# Patient Record
Sex: MALE | Race: WHITE | NOT HISPANIC OR LATINO | Employment: FULL TIME | URBAN - METROPOLITAN AREA
[De-identification: names, ages, dates, MRNs, and addresses within clinical notes are randomized per-mention and may not be internally consistent; named-entity substitution may affect disease eponyms.]

---

## 2023-11-06 ENCOUNTER — HOSPITAL ENCOUNTER (OUTPATIENT)
Facility: CLINIC | Age: 28
Setting detail: OBSERVATION
Discharge: HOME | End: 2023-11-07
Attending: STUDENT IN AN ORGANIZED HEALTH CARE EDUCATION/TRAINING PROGRAM | Admitting: HOSPITALIST

## 2023-11-06 DIAGNOSIS — K92.2 UPPER GI BLEED: ICD-10-CM

## 2023-11-06 LAB
ABO/RH(D): NORMAL
ALBUMIN SERPL BCG-MCNC: 4.2 G/DL (ref 3.5–5.2)
ALP SERPL-CCNC: 55 U/L (ref 40–129)
ALT SERPL W P-5'-P-CCNC: 21 U/L (ref 0–70)
ANION GAP SERPL CALCULATED.3IONS-SCNC: 8 MMOL/L (ref 7–15)
ANTIBODY SCREEN: NEGATIVE
AST SERPL W P-5'-P-CCNC: 16 U/L (ref 0–45)
BASOPHILS # BLD AUTO: 0.1 10E3/UL (ref 0–0.2)
BASOPHILS NFR BLD AUTO: 1 %
BILIRUB SERPL-MCNC: 0.7 MG/DL
BUN SERPL-MCNC: 32.8 MG/DL (ref 6–20)
CALCIUM SERPL-MCNC: 9 MG/DL (ref 8.6–10)
CHLORIDE SERPL-SCNC: 102 MMOL/L (ref 98–107)
CREAT SERPL-MCNC: 1.03 MG/DL (ref 0.67–1.17)
DEPRECATED HCO3 PLAS-SCNC: 28 MMOL/L (ref 22–29)
EGFRCR SERPLBLD CKD-EPI 2021: >90 ML/MIN/1.73M2
EOSINOPHIL # BLD AUTO: 0.4 10E3/UL (ref 0–0.7)
EOSINOPHIL NFR BLD AUTO: 4 %
ERYTHROCYTE [DISTWIDTH] IN BLOOD BY AUTOMATED COUNT: 12 % (ref 10–15)
GLUCOSE SERPL-MCNC: 110 MG/DL (ref 70–99)
HCT VFR BLD AUTO: 44.1 % (ref 40–53)
HGB BLD-MCNC: 12.7 G/DL (ref 13.3–17.7)
HGB BLD-MCNC: 14.7 G/DL (ref 13.3–17.7)
HOLD SPECIMEN: NORMAL
IMM GRANULOCYTES # BLD: 0.1 10E3/UL
IMM GRANULOCYTES NFR BLD: 1 %
LYMPHOCYTES # BLD AUTO: 3 10E3/UL (ref 0.8–5.3)
LYMPHOCYTES NFR BLD AUTO: 26 %
MCH RBC QN AUTO: 30.7 PG (ref 26.5–33)
MCHC RBC AUTO-ENTMCNC: 33.3 G/DL (ref 31.5–36.5)
MCV RBC AUTO: 92 FL (ref 78–100)
MONOCYTES # BLD AUTO: 0.8 10E3/UL (ref 0–1.3)
MONOCYTES NFR BLD AUTO: 7 %
NEUTROPHILS # BLD AUTO: 7.4 10E3/UL (ref 1.6–8.3)
NEUTROPHILS NFR BLD AUTO: 61 %
NRBC # BLD AUTO: 0 10E3/UL
NRBC BLD AUTO-RTO: 0 /100
PLATELET # BLD AUTO: 179 10E3/UL (ref 150–450)
POTASSIUM SERPL-SCNC: 4.7 MMOL/L (ref 3.4–5.3)
PROT SERPL-MCNC: 5.9 G/DL (ref 6.4–8.3)
RBC # BLD AUTO: 4.79 10E6/UL (ref 4.4–5.9)
SODIUM SERPL-SCNC: 138 MMOL/L (ref 135–145)
SPECIMEN EXPIRATION DATE: NORMAL
WBC # BLD AUTO: 11.7 10E3/UL (ref 4–11)

## 2023-11-06 PROCEDURE — 36415 COLL VENOUS BLD VENIPUNCTURE: CPT | Performed by: PHYSICIAN ASSISTANT

## 2023-11-06 PROCEDURE — 80053 COMPREHEN METABOLIC PANEL: CPT | Performed by: STUDENT IN AN ORGANIZED HEALTH CARE EDUCATION/TRAINING PROGRAM

## 2023-11-06 PROCEDURE — C9113 INJ PANTOPRAZOLE SODIUM, VIA: HCPCS | Mod: JZ | Performed by: PHYSICIAN ASSISTANT

## 2023-11-06 PROCEDURE — 96374 THER/PROPH/DIAG INJ IV PUSH: CPT

## 2023-11-06 PROCEDURE — 85025 COMPLETE CBC W/AUTO DIFF WBC: CPT | Performed by: STUDENT IN AN ORGANIZED HEALTH CARE EDUCATION/TRAINING PROGRAM

## 2023-11-06 PROCEDURE — C9113 INJ PANTOPRAZOLE SODIUM, VIA: HCPCS | Mod: JZ | Performed by: STUDENT IN AN ORGANIZED HEALTH CARE EDUCATION/TRAINING PROGRAM

## 2023-11-06 PROCEDURE — 99222 1ST HOSP IP/OBS MODERATE 55: CPT | Mod: AI | Performed by: PHYSICIAN ASSISTANT

## 2023-11-06 PROCEDURE — 99285 EMERGENCY DEPT VISIT HI MDM: CPT | Mod: 25

## 2023-11-06 PROCEDURE — 250N000011 HC RX IP 250 OP 636: Mod: JZ | Performed by: STUDENT IN AN ORGANIZED HEALTH CARE EDUCATION/TRAINING PROGRAM

## 2023-11-06 PROCEDURE — 258N000003 HC RX IP 258 OP 636: Performed by: STUDENT IN AN ORGANIZED HEALTH CARE EDUCATION/TRAINING PROGRAM

## 2023-11-06 PROCEDURE — 250N000011 HC RX IP 250 OP 636: Mod: JZ | Performed by: PHYSICIAN ASSISTANT

## 2023-11-06 PROCEDURE — G0378 HOSPITAL OBSERVATION PER HR: HCPCS

## 2023-11-06 PROCEDURE — 85018 HEMOGLOBIN: CPT | Performed by: PHYSICIAN ASSISTANT

## 2023-11-06 PROCEDURE — 36415 COLL VENOUS BLD VENIPUNCTURE: CPT | Performed by: STUDENT IN AN ORGANIZED HEALTH CARE EDUCATION/TRAINING PROGRAM

## 2023-11-06 PROCEDURE — 96376 TX/PRO/DX INJ SAME DRUG ADON: CPT

## 2023-11-06 PROCEDURE — 96375 TX/PRO/DX INJ NEW DRUG ADDON: CPT

## 2023-11-06 PROCEDURE — 86901 BLOOD TYPING SEROLOGIC RH(D): CPT | Performed by: STUDENT IN AN ORGANIZED HEALTH CARE EDUCATION/TRAINING PROGRAM

## 2023-11-06 PROCEDURE — 258N000003 HC RX IP 258 OP 636: Performed by: PHYSICIAN ASSISTANT

## 2023-11-06 PROCEDURE — 93005 ELECTROCARDIOGRAM TRACING: CPT

## 2023-11-06 RX ORDER — PROCHLORPERAZINE MALEATE 10 MG
10 TABLET ORAL EVERY 6 HOURS PRN
Status: DISCONTINUED | OUTPATIENT
Start: 2023-11-06 | End: 2023-11-07 | Stop reason: HOSPADM

## 2023-11-06 RX ORDER — NALOXONE HYDROCHLORIDE 0.4 MG/ML
0.4 INJECTION, SOLUTION INTRAMUSCULAR; INTRAVENOUS; SUBCUTANEOUS
Status: DISCONTINUED | OUTPATIENT
Start: 2023-11-06 | End: 2023-11-07 | Stop reason: HOSPADM

## 2023-11-06 RX ORDER — ACETAMINOPHEN 500 MG
1000 TABLET ORAL EVERY 6 HOURS PRN
Status: DISCONTINUED | OUTPATIENT
Start: 2023-11-06 | End: 2023-11-07 | Stop reason: HOSPADM

## 2023-11-06 RX ORDER — SODIUM CHLORIDE, SODIUM LACTATE, POTASSIUM CHLORIDE, CALCIUM CHLORIDE 600; 310; 30; 20 MG/100ML; MG/100ML; MG/100ML; MG/100ML
INJECTION, SOLUTION INTRAVENOUS CONTINUOUS
Status: DISCONTINUED | OUTPATIENT
Start: 2023-11-06 | End: 2023-11-07 | Stop reason: HOSPADM

## 2023-11-06 RX ORDER — ONDANSETRON 2 MG/ML
4 INJECTION INTRAMUSCULAR; INTRAVENOUS ONCE
Status: COMPLETED | OUTPATIENT
Start: 2023-11-06 | End: 2023-11-06

## 2023-11-06 RX ORDER — ONDANSETRON 2 MG/ML
4 INJECTION INTRAMUSCULAR; INTRAVENOUS EVERY 6 HOURS PRN
Status: DISCONTINUED | OUTPATIENT
Start: 2023-11-06 | End: 2023-11-07 | Stop reason: HOSPADM

## 2023-11-06 RX ORDER — AMOXICILLIN 250 MG
1 CAPSULE ORAL 2 TIMES DAILY PRN
Status: DISCONTINUED | OUTPATIENT
Start: 2023-11-06 | End: 2023-11-07 | Stop reason: HOSPADM

## 2023-11-06 RX ORDER — PROCHLORPERAZINE 25 MG
25 SUPPOSITORY, RECTAL RECTAL EVERY 12 HOURS PRN
Status: DISCONTINUED | OUTPATIENT
Start: 2023-11-06 | End: 2023-11-07 | Stop reason: HOSPADM

## 2023-11-06 RX ORDER — NALOXONE HYDROCHLORIDE 0.4 MG/ML
0.2 INJECTION, SOLUTION INTRAMUSCULAR; INTRAVENOUS; SUBCUTANEOUS
Status: DISCONTINUED | OUTPATIENT
Start: 2023-11-06 | End: 2023-11-07 | Stop reason: HOSPADM

## 2023-11-06 RX ORDER — HYDROMORPHONE HCL IN WATER/PF 6 MG/30 ML
0.2 PATIENT CONTROLLED ANALGESIA SYRINGE INTRAVENOUS EVERY 6 HOURS PRN
Status: DISCONTINUED | OUTPATIENT
Start: 2023-11-06 | End: 2023-11-07 | Stop reason: HOSPADM

## 2023-11-06 RX ORDER — OXYCODONE HYDROCHLORIDE 5 MG/1
5 TABLET ORAL EVERY 4 HOURS PRN
Status: DISCONTINUED | OUTPATIENT
Start: 2023-11-06 | End: 2023-11-07 | Stop reason: HOSPADM

## 2023-11-06 RX ORDER — ONDANSETRON 4 MG/1
4 TABLET, ORALLY DISINTEGRATING ORAL EVERY 6 HOURS PRN
Status: DISCONTINUED | OUTPATIENT
Start: 2023-11-06 | End: 2023-11-07 | Stop reason: HOSPADM

## 2023-11-06 RX ORDER — AMOXICILLIN 250 MG
2 CAPSULE ORAL 2 TIMES DAILY PRN
Status: DISCONTINUED | OUTPATIENT
Start: 2023-11-06 | End: 2023-11-07 | Stop reason: HOSPADM

## 2023-11-06 RX ADMIN — SODIUM CHLORIDE 1000 ML: 9 INJECTION, SOLUTION INTRAVENOUS at 17:22

## 2023-11-06 RX ADMIN — SODIUM CHLORIDE, POTASSIUM CHLORIDE, SODIUM LACTATE AND CALCIUM CHLORIDE: 600; 310; 30; 20 INJECTION, SOLUTION INTRAVENOUS at 20:36

## 2023-11-06 RX ADMIN — PANTOPRAZOLE SODIUM 80 MG: 40 INJECTION, POWDER, FOR SOLUTION INTRAVENOUS at 18:02

## 2023-11-06 RX ADMIN — ONDANSETRON 4 MG: 2 INJECTION INTRAMUSCULAR; INTRAVENOUS at 17:22

## 2023-11-06 RX ADMIN — PANTOPRAZOLE SODIUM 40 MG: 40 INJECTION, POWDER, FOR SOLUTION INTRAVENOUS at 20:36

## 2023-11-06 ASSESSMENT — ACTIVITIES OF DAILY LIVING (ADL)
ADLS_ACUITY_SCORE: 35
ADLS_ACUITY_SCORE: 18
ADLS_ACUITY_SCORE: 35
ADLS_ACUITY_SCORE: 20

## 2023-11-06 NOTE — ED TRIAGE NOTES
C/O nausea and vomiting; emesis bright red. Hx acid reflux. Denies alcohol. Pt also reports 1 month diarrhea that has now resolved as of 2 weeks ago. Denies medical hx or blood thinner use. ABC in tact; tachycardia; A/OX4

## 2023-11-06 NOTE — ED PROVIDER NOTES
History     Chief Complaint:  Hematemesis       HPI   Vladislav Joseph is a 28 year old male with a history of acid reflux who presents with hematemesis. The patient states that today he was feeling nauseated all day then around 1530 he had 3 episodes of vomiting, all containing bright red blood. The patient showed me a picture of one episode which looked like maroon colored vomit with some clots.  He states that his acid reflux has gotten so bad in the past that vomiting has made him feel better, last time vomiting before today was about a month ago. He mentions that he became very light-headed while in triage and thinks he might have had a syncopal episode after getting an IV set up. He states he does not regularly drink alcohol, no recent aspirin use, no suspicious foods today. He denies any blood in stool.  Denies using NSAIDs and salicylates.    Independent Historian:    None - Patient Only    Review of External Notes:  None     Allergies:  Penicillins     Physical Exam   Patient Vitals for the past 24 hrs:   BP Temp Temp src Pulse Resp SpO2 Weight   11/06/23 1830 90/59 -- -- 103 -- 100 % --   11/06/23 1812 -- -- -- 110 -- 100 % --   11/06/23 1757 106/74 -- -- 105 -- 99 % --   11/06/23 1742 116/65 -- -- 113 -- 99 % --   11/06/23 1727 114/65 -- -- 106 -- 95 % --   11/06/23 1712 109/71 -- -- 110 -- 100 % --   11/06/23 1650 119/68 96.8  F (36  C) Temporal (!) 133 18 100 % 87.9 kg (193 lb 12.6 oz)        Physical Exam  GENERAL: Patient appears slightly fatigued and in mild distress.  HEAD: Atraumatic.  NECK: No rigidity  CV: Tachycardic and regular, no murmurs rubs or gallops  PULM: CTAB with good aeration; no retractions, rales, rhonchi, or wheezing  ABD: Soft, nontender, nondistended, no guarding  DERM: No rash. Skin warm and dry  EXTREMITY: Moving all extremities without difficulty. No calf tenderness or peripheral edema  VASCULAR: Symmetric pulses bilaterally      Emergency Department Course   ECG  ECG  obtained at 1717, ECG read at 1718  Sinus tachycardia  Otherwise normal ECG  Rate 103 bpm. IA interval 140 ms. QRS duration 76 ms. QT/QTc 324/424 ms. P-R-T axes 42 46 47.    Laboratory: Imaging:   Labs Ordered and Resulted from Time of ED Arrival to Time of ED Departure   COMPREHENSIVE METABOLIC PANEL - Abnormal       Result Value    Sodium 138      Potassium 4.7      Carbon Dioxide (CO2) 28      Anion Gap 8      Urea Nitrogen 32.8 (*)     Creatinine 1.03      GFR Estimate >90      Calcium 9.0      Chloride 102      Glucose 110 (*)     Alkaline Phosphatase 55      AST 16      ALT 21      Protein Total 5.9 (*)     Albumin 4.2      Bilirubin Total 0.7     CBC WITH PLATELETS AND DIFFERENTIAL - Abnormal    WBC Count 11.7 (*)     RBC Count 4.79      Hemoglobin 14.7      Hematocrit 44.1      MCV 92      MCH 30.7      MCHC 33.3      RDW 12.0      Platelet Count 179      % Neutrophils 61      % Lymphocytes 26      % Monocytes 7      % Eosinophils 4      % Basophils 1      % Immature Granulocytes 1      NRBCs per 100 WBC 0      Absolute Neutrophils 7.4      Absolute Lymphocytes 3.0      Absolute Monocytes 0.8      Absolute Eosinophils 0.4      Absolute Basophils 0.1      Absolute Immature Granulocytes 0.1      Absolute NRBCs 0.0     TYPE AND SCREEN, ADULT    ABO/RH(D) A POS      Antibody Screen Negative      SPECIMEN EXPIRATION DATE [id number]     ABO/RH TYPE AND SCREEN     No orders to display           Emergency Department Course & Assessments:       Interventions:  Medications   lactated ringers BOLUS 1,000 mL (1,000 mLs Intravenous Not Given 11/6/23 1842)   lactated ringers BOLUS 1,000 mL (has no administration in time range)   ondansetron (ZOFRAN) injection 4 mg (4 mg Intravenous $Given 11/6/23 1722)   sodium chloride 0.9% BOLUS 1,000 mL (1,000 mLs Intravenous $New Bag 11/6/23 1722)   pantoprazole (PROTONIX) IV push injection 80 mg (80 mg Intravenous $Given 11/6/23 1802)      Assessments, Independent Interpretation,  Consult/Discussion of ManagementTests:  ED Course as of 11/06/23 1918 Mon Nov 06, 2023   1710 I examined the patient and obtained history    1809 I consulted with Dr. Bruno about the patient and plan of care   Hospitalist PA for Dr. Perez    Social Determinants of Health affecting care:  None    Disposition:  The patient was admitted to the hospital under the care of Dr. Bruno.     Impression & Plan    CMS Diagnoses: None    Code Status: No Order    Medical Decision Making:  Symptoms concerning for upper GI bleed.   Chronic conditions complicating -prior episodes of gastritis and vomiting.  DDx considered esophageal rupture, lower GI bleed, coagulopathy.  Labs notable for initial hemoglobin within normal meds.  BUN is elevated at 32.8.  Repeat hemoglobin 12.7.  Blood pressure slightly soft but heart rate did improve with fluids and systolic blood pressure in the 90s.  Type and screened   Given IV protonix   Patient is not anticoagulated.  No excess alcohol use and no reported liver disease, therefore do not think this is variceal.  Discussed with GI DrJami who recommended scope in the morning unless patient decompensates and then it would occur or promptly.  Discussed with hospitalist PA.  Patient to be admitted with plan for endoscopy.  Patient admitted in improved condition.         Diagnosis:    ICD-10-CM    1. Upper GI bleed  K92.2            Discharge Medications:  New Prescriptions    No medications on file        Scribe Disclosure:  I, Sonu Christian, am serving as a scribe at 5:03 PM on 11/6/2023 to document services personally performed by Chester Arteaga MD based on my observations and the provider's statements to me.    11/6/2023   Chester Arteaga MD Foss, Kevin, MD  11/06/23 2001

## 2023-11-07 ENCOUNTER — ANESTHESIA (OUTPATIENT)
Dept: SURGERY | Facility: CLINIC | Age: 28
End: 2023-11-07

## 2023-11-07 ENCOUNTER — ANESTHESIA EVENT (OUTPATIENT)
Dept: SURGERY | Facility: CLINIC | Age: 28
End: 2023-11-07

## 2023-11-07 VITALS
TEMPERATURE: 97.6 F | SYSTOLIC BLOOD PRESSURE: 98 MMHG | HEART RATE: 79 BPM | OXYGEN SATURATION: 100 % | HEIGHT: 67 IN | BODY MASS INDEX: 31.18 KG/M2 | RESPIRATION RATE: 16 BRPM | WEIGHT: 198.63 LBS | DIASTOLIC BLOOD PRESSURE: 62 MMHG

## 2023-11-07 LAB
ALBUMIN SERPL BCG-MCNC: 3.3 G/DL (ref 3.5–5.2)
ALP SERPL-CCNC: 43 U/L (ref 40–129)
ALT SERPL W P-5'-P-CCNC: 15 U/L (ref 0–70)
ANION GAP SERPL CALCULATED.3IONS-SCNC: 10 MMOL/L (ref 7–15)
AST SERPL W P-5'-P-CCNC: 12 U/L (ref 0–45)
ATRIAL RATE - MUSE: 103 BPM
BASOPHILS # BLD AUTO: 0 10E3/UL (ref 0–0.2)
BASOPHILS NFR BLD AUTO: 0 %
BILIRUB SERPL-MCNC: 0.4 MG/DL
BUN SERPL-MCNC: 26.9 MG/DL (ref 6–20)
CALCIUM SERPL-MCNC: 8.4 MG/DL (ref 8.6–10)
CHLORIDE SERPL-SCNC: 105 MMOL/L (ref 98–107)
CREAT SERPL-MCNC: 1.05 MG/DL (ref 0.67–1.17)
DEPRECATED HCO3 PLAS-SCNC: 26 MMOL/L (ref 22–29)
DIASTOLIC BLOOD PRESSURE - MUSE: NORMAL MMHG
EGFRCR SERPLBLD CKD-EPI 2021: >90 ML/MIN/1.73M2
EOSINOPHIL # BLD AUTO: 0.1 10E3/UL (ref 0–0.7)
EOSINOPHIL NFR BLD AUTO: 1 %
ERYTHROCYTE [DISTWIDTH] IN BLOOD BY AUTOMATED COUNT: 12.3 % (ref 10–15)
GLUCOSE SERPL-MCNC: 98 MG/DL (ref 70–99)
HCT VFR BLD AUTO: 34.8 % (ref 40–53)
HGB BLD-MCNC: 11.1 G/DL (ref 13.3–17.7)
HGB BLD-MCNC: 11.7 G/DL (ref 13.3–17.7)
HGB BLD-MCNC: 12.2 G/DL (ref 13.3–17.7)
IMM GRANULOCYTES # BLD: 0.1 10E3/UL
IMM GRANULOCYTES NFR BLD: 1 %
INTERPRETATION ECG - MUSE: NORMAL
LYMPHOCYTES # BLD AUTO: 3.5 10E3/UL (ref 0.8–5.3)
LYMPHOCYTES NFR BLD AUTO: 38 %
MCH RBC QN AUTO: 31 PG (ref 26.5–33)
MCHC RBC AUTO-ENTMCNC: 33.6 G/DL (ref 31.5–36.5)
MCV RBC AUTO: 92 FL (ref 78–100)
MONOCYTES # BLD AUTO: 0.6 10E3/UL (ref 0–1.3)
MONOCYTES NFR BLD AUTO: 7 %
NEUTROPHILS # BLD AUTO: 4.8 10E3/UL (ref 1.6–8.3)
NEUTROPHILS NFR BLD AUTO: 53 %
NRBC # BLD AUTO: 0 10E3/UL
NRBC BLD AUTO-RTO: 0 /100
P AXIS - MUSE: 42 DEGREES
PLATELET # BLD AUTO: 152 10E3/UL (ref 150–450)
POTASSIUM SERPL-SCNC: 3.9 MMOL/L (ref 3.4–5.3)
PR INTERVAL - MUSE: 140 MS
PROT SERPL-MCNC: 5.1 G/DL (ref 6.4–8.3)
QRS DURATION - MUSE: 76 MS
QT - MUSE: 324 MS
QTC - MUSE: 424 MS
R AXIS - MUSE: 46 DEGREES
RBC # BLD AUTO: 3.77 10E6/UL (ref 4.4–5.9)
SODIUM SERPL-SCNC: 141 MMOL/L (ref 135–145)
SYSTOLIC BLOOD PRESSURE - MUSE: NORMAL MMHG
T AXIS - MUSE: 47 DEGREES
UPPER GI ENDOSCOPY: NORMAL
VENTRICULAR RATE- MUSE: 103 BPM
WBC # BLD AUTO: 9.1 10E3/UL (ref 4–11)

## 2023-11-07 PROCEDURE — 258N000003 HC RX IP 258 OP 636: Performed by: PHYSICIAN ASSISTANT

## 2023-11-07 PROCEDURE — G0378 HOSPITAL OBSERVATION PER HR: HCPCS

## 2023-11-07 PROCEDURE — 999N000141 HC STATISTIC PRE-PROCEDURE NURSING ASSESSMENT: Performed by: INTERNAL MEDICINE

## 2023-11-07 PROCEDURE — 99239 HOSP IP/OBS DSCHRG MGMT >30: CPT | Performed by: INTERNAL MEDICINE

## 2023-11-07 PROCEDURE — C9113 INJ PANTOPRAZOLE SODIUM, VIA: HCPCS | Mod: JZ | Performed by: PHYSICIAN ASSISTANT

## 2023-11-07 PROCEDURE — 250N000009 HC RX 250: Performed by: NURSE ANESTHETIST, CERTIFIED REGISTERED

## 2023-11-07 PROCEDURE — 258N000003 HC RX IP 258 OP 636: Performed by: NURSE ANESTHETIST, CERTIFIED REGISTERED

## 2023-11-07 PROCEDURE — 80053 COMPREHEN METABOLIC PANEL: CPT | Performed by: PHYSICIAN ASSISTANT

## 2023-11-07 PROCEDURE — 36415 COLL VENOUS BLD VENIPUNCTURE: CPT | Performed by: PHYSICIAN ASSISTANT

## 2023-11-07 PROCEDURE — 710N000012 HC RECOVERY PHASE 2, PER MINUTE: Performed by: INTERNAL MEDICINE

## 2023-11-07 PROCEDURE — 370N000017 HC ANESTHESIA TECHNICAL FEE, PER MIN: Performed by: INTERNAL MEDICINE

## 2023-11-07 PROCEDURE — 85018 HEMOGLOBIN: CPT | Performed by: PHYSICIAN ASSISTANT

## 2023-11-07 PROCEDURE — 96376 TX/PRO/DX INJ SAME DRUG ADON: CPT

## 2023-11-07 PROCEDURE — 250N000011 HC RX IP 250 OP 636: Mod: JZ | Performed by: NURSE ANESTHETIST, CERTIFIED REGISTERED

## 2023-11-07 PROCEDURE — 272N000001 HC OR GENERAL SUPPLY STERILE: Performed by: INTERNAL MEDICINE

## 2023-11-07 PROCEDURE — 250N000011 HC RX IP 250 OP 636: Mod: JZ | Performed by: PHYSICIAN ASSISTANT

## 2023-11-07 PROCEDURE — 360N000075 HC SURGERY LEVEL 2, PER MIN: Performed by: INTERNAL MEDICINE

## 2023-11-07 RX ORDER — FENTANYL CITRATE 50 UG/ML
25 INJECTION, SOLUTION INTRAMUSCULAR; INTRAVENOUS
Status: DISCONTINUED | OUTPATIENT
Start: 2023-11-07 | End: 2023-11-07 | Stop reason: HOSPADM

## 2023-11-07 RX ORDER — ONDANSETRON 4 MG/1
4 TABLET, ORALLY DISINTEGRATING ORAL EVERY 30 MIN PRN
Status: DISCONTINUED | OUTPATIENT
Start: 2023-11-07 | End: 2023-11-07 | Stop reason: HOSPADM

## 2023-11-07 RX ORDER — DEXMEDETOMIDINE HYDROCHLORIDE 4 UG/ML
INJECTION, SOLUTION INTRAVENOUS PRN
Status: DISCONTINUED | OUTPATIENT
Start: 2023-11-07 | End: 2023-11-07

## 2023-11-07 RX ORDER — LIDOCAINE 40 MG/G
CREAM TOPICAL
Status: DISCONTINUED | OUTPATIENT
Start: 2023-11-07 | End: 2023-11-07 | Stop reason: HOSPADM

## 2023-11-07 RX ORDER — FLUMAZENIL 0.1 MG/ML
0.2 INJECTION, SOLUTION INTRAVENOUS
Status: DISCONTINUED | OUTPATIENT
Start: 2023-11-07 | End: 2023-11-07 | Stop reason: HOSPADM

## 2023-11-07 RX ORDER — SODIUM CHLORIDE, SODIUM LACTATE, POTASSIUM CHLORIDE, CALCIUM CHLORIDE 600; 310; 30; 20 MG/100ML; MG/100ML; MG/100ML; MG/100ML
INJECTION, SOLUTION INTRAVENOUS CONTINUOUS PRN
Status: DISCONTINUED | OUTPATIENT
Start: 2023-11-07 | End: 2023-11-07

## 2023-11-07 RX ORDER — OXYCODONE HYDROCHLORIDE 5 MG/1
10 TABLET ORAL
Status: DISCONTINUED | OUTPATIENT
Start: 2023-11-07 | End: 2023-11-07 | Stop reason: HOSPADM

## 2023-11-07 RX ORDER — LIDOCAINE HYDROCHLORIDE 20 MG/ML
INJECTION, SOLUTION INFILTRATION; PERINEURAL PRN
Status: DISCONTINUED | OUTPATIENT
Start: 2023-11-07 | End: 2023-11-07

## 2023-11-07 RX ORDER — ONDANSETRON 2 MG/ML
INJECTION INTRAMUSCULAR; INTRAVENOUS PRN
Status: DISCONTINUED | OUTPATIENT
Start: 2023-11-07 | End: 2023-11-07

## 2023-11-07 RX ORDER — PROPOFOL 10 MG/ML
INJECTION, EMULSION INTRAVENOUS PRN
Status: DISCONTINUED | OUTPATIENT
Start: 2023-11-07 | End: 2023-11-07

## 2023-11-07 RX ORDER — ONDANSETRON 2 MG/ML
4 INJECTION INTRAMUSCULAR; INTRAVENOUS EVERY 30 MIN PRN
Status: DISCONTINUED | OUTPATIENT
Start: 2023-11-07 | End: 2023-11-07 | Stop reason: HOSPADM

## 2023-11-07 RX ORDER — GLYCOPYRROLATE 0.2 MG/ML
INJECTION, SOLUTION INTRAMUSCULAR; INTRAVENOUS PRN
Status: DISCONTINUED | OUTPATIENT
Start: 2023-11-07 | End: 2023-11-07

## 2023-11-07 RX ORDER — OXYCODONE HYDROCHLORIDE 5 MG/1
5 TABLET ORAL
Status: DISCONTINUED | OUTPATIENT
Start: 2023-11-07 | End: 2023-11-07 | Stop reason: HOSPADM

## 2023-11-07 RX ORDER — SODIUM CHLORIDE, SODIUM LACTATE, POTASSIUM CHLORIDE, CALCIUM CHLORIDE 600; 310; 30; 20 MG/100ML; MG/100ML; MG/100ML; MG/100ML
INJECTION, SOLUTION INTRAVENOUS CONTINUOUS
Status: DISCONTINUED | OUTPATIENT
Start: 2023-11-07 | End: 2023-11-07 | Stop reason: HOSPADM

## 2023-11-07 RX ADMIN — PANTOPRAZOLE SODIUM 40 MG: 40 INJECTION, POWDER, FOR SOLUTION INTRAVENOUS at 09:25

## 2023-11-07 RX ADMIN — DEXMEDETOMIDINE 20 MCG: 100 INJECTION, SOLUTION, CONCENTRATE INTRAVENOUS at 13:55

## 2023-11-07 RX ADMIN — PROPOFOL 150 MG: 10 INJECTION, EMULSION INTRAVENOUS at 13:59

## 2023-11-07 RX ADMIN — SODIUM CHLORIDE, POTASSIUM CHLORIDE, SODIUM LACTATE AND CALCIUM CHLORIDE: 600; 310; 30; 20 INJECTION, SOLUTION INTRAVENOUS at 06:17

## 2023-11-07 RX ADMIN — ONDANSETRON 4 MG: 2 INJECTION INTRAMUSCULAR; INTRAVENOUS at 13:55

## 2023-11-07 RX ADMIN — LIDOCAINE HYDROCHLORIDE 50 MG: 20 INJECTION, SOLUTION INFILTRATION; PERINEURAL at 13:55

## 2023-11-07 RX ADMIN — PROPOFOL 100 MG: 10 INJECTION, EMULSION INTRAVENOUS at 13:55

## 2023-11-07 RX ADMIN — SODIUM CHLORIDE, POTASSIUM CHLORIDE, SODIUM LACTATE AND CALCIUM CHLORIDE: 600; 310; 30; 20 INJECTION, SOLUTION INTRAVENOUS at 12:42

## 2023-11-07 RX ADMIN — GLYCOPYRROLATE 0.1 MG: 0.2 INJECTION, SOLUTION INTRAMUSCULAR; INTRAVENOUS at 13:55

## 2023-11-07 ASSESSMENT — ACTIVITIES OF DAILY LIVING (ADL)
ADLS_ACUITY_SCORE: 20

## 2023-11-07 NOTE — PLAN OF CARE
VS: Initially tachycardic upon arrival to unit, but stabilized overnight. Afebrile. Systolic BPs remaining above 100.  Respiratory: WDL; lung sounds clear.   GI: 1 small, formed, black colored stool overnight. Pt reports intermittent nausea, but no emesis episodes overnight. NPO. Audible bowel sounds. Soft, nontender abdomen.  : Voiding.   Activity: Independent. Intermittent dizziness with position changes. Pts family visited upon arrival to unit.   Pain: Denies.  Lines: R PIV infusing LR @ 100 mL/hr. L PIV SL.   Plan: Monitor stools/emesis. IV fluids. NPO.

## 2023-11-07 NOTE — H&P
[facility]    Hospitalist History and Physical    Name: Vladislav Joseph    MRN: [id number]  YOB: 1995    Age: 28 year old  Date of Admission:  11/6/2023  Date of Service (when I saw the patient): 11/06/23    Assessment & Plan   Vladislav Joseph is a 28 year old male with PMH significant for GERD not on medical management who presents to the ED on 11/6/2023 for evaluation of hematemesis.    ED work-up reveals: tachycardia otherwise hemodynamically stable, mild leukocytosis of 11.7, Hgb of 14.7, BUN of 32.8, protein of 5.9, glucose of 110, type and screen performed, and EKG shows rate of 103 bpm in sinus tachycardia.     #Hematemesis, upper GI bleed  #H/o GERD: nauseated all day on 11/6 with onset around 15:00 of hematemesis x3 that appeared more maroon than bright red in color. Episode of diarrhea (uncertain if melena or hematochezia since patient did not look) followed by hematemesis in the ED. Initial Hgb stable at 14.7. Tachycardic but BP stable currently. Denies regular NSAID use and reports to only be an occasional alcohol user with last drink the evening of 11/3 where the patient consumed 4 beers.   -patient needs two large bore IVs in place  -continue IVF with LR at 100 ml/hour  -IV Protonix BID  -monitor I&Os  -NPO  -type and screen completed, consented patient for blood in ED if needed  -conditional unit of PRBCs if Hgb <7.0  -serial hemoglobins now then every 6 hours overnight or more frequently if becomes unstable  -GI consult, aware of patient from ED provider, due to hemodynamic stability will hold off on EGD this evening  -if patient becomes hypotensive or Hgb drops to <7.0 cross cover provider should be notified immediately to assess and contact GI to urgently perform EGD if needed     Clinically Significant Risk Factors Present on Admission                                  DVT Prophylaxis: Low Risk/Ambulatory with no VTE prophylaxis indicated  Code Status:  Full Code, discussed with patient   Disposition: Expected discharge in 24-48 hours, will admit to observation     Primary Care Physician   None currently     Chief Complaint   Hematemesis     History obtained from discussion with ED provider, Dr. Arteaga, chart review, and interview with patient. Patient's mother is at the bedside to provide additional history.     History of Present Illness   Vladislav Joseph is a 28 year old male who presents with hematemesis. Patient states he last had something to eat the evening of 11/5.  He has not ate anything today due to feeling nauseated all day.  Around 3 PM today the patient had 3 episodes of vomiting containing bright red blood. When the patient showed a picture the vomit appeared maroon-colored.  The patient reports a history of bad acid reflux but is not on any medical management for this and has never had a previous EGD.  He notes mild lower crampy/achy abdominal pain that improves with vomiting.  At home he has felt subjectively warm, chilled, diaphoretic, and lightheaded/dizzy.  He did syncopize when the ER nurse put an IV in him.  Denies associated chest pain or shortness of breath.  Denies NSAID use and only drinks occasionally.  He states he last had 4 beers the evening 11/3.  He does consume marijuana gummies occasionally but denies any other illicit drug use.  Denies melena or hematochezia.  No prior episodes of bleeding like this.  No prior history of known peptic ulcer disease. He has had one episode of hematemesis in the ED after having diarrhea (he is unsure the color of his diarrhea). Denies any recent travel or change in diet/supplements.     Past Medical History    GERD    Past Surgical History   Reviewed with patient and is noncontributory.     Prior to Admission Medications   Prior to Admission Medications   Prescriptions Last Dose Informant Patient Reported? Taking?   citalopram (CELEXA) 20 MG tablet   No No   Sig: Take 1/2 tablet (10 mg) for 1-2  weeks, then increase to 1 tablet orally daily if needed   clotrimazole (LOTRIMIN) 1 % cream   No No   Sig: Apply topically 2 times daily      Facility-Administered Medications: None     Allergies   Allergies   Allergen Reactions    Penicillins Unknown     Social History   Social History     Tobacco Use    Smoking status: Former    Smokeless tobacco: Not on file   Substance Use Topics    Alcohol use: No     Social History     Social History Narrative    Not on file     Family History   Family history reviewed with patient and is noncontributory.    Review of Systems   A Comprehensive greater than 10 system review of systems was carried out.  Pertinent positives and negatives are noted above.  Otherwise negative for contributory information.    Physical Exam   Temp: 96.8  F (36  C) Temp src: Temporal BP: 95/73 Pulse: 106   Resp: 18 SpO2: 100 % O2 Device: None (Room air)    Vital Signs with Ranges  Temp:  [96.8  F (36  C)] 96.8  F (36  C)  Pulse:  [103-133] 106  Resp:  [18] 18  BP: ()/(59-74) 95/73  SpO2:  [95 %-100 %] 100 %  193 lbs 12.55 oz    GEN:  Alert, oriented x 3, appears comfortable sitting up on gurney, no overt distress  HEENT:  Normocephalic/atraumatic, no scleral icterus, no nasal discharge, mouth moist.  CV:  Regular rate and rhythm, no murmur or JVD.  S1 + S2 noted, no S3 or S4.  LUNGS:  Clear to auscultation bilaterally without rales/rhonchi/wheezing/retractions.  Symmetric chest rise on inhalation noted.  ABD:  Active bowel sounds, soft, non-tender/non-distended.  No rebound/guarding/rigidity.  EXT:  No LE edema.  No cyanosis.  No acute joint synovitis noted.  SKIN:  Dry to touch, slightly pale appearing   NEURO:  Symmetric muscle strength, sensation to touch grossly intact.  Coordination symmetric on general exam.  No new focal deficits appreciated.    Data   Data reviewed today:  I personally reviewed EKG showing rate of 103 bpm in sinus tachycardia.    Results for orders placed or performed  during the hospital encounter of 11/06/23   [place] Draw     Status: None    Narrative    The following orders were created for panel order [place] Draw.  Procedure                               Abnormality         Status                     ---------                               -----------         ------                     Extra Blue Top Tube[[id number]]                              Final result               Extra Red Top Tube[[id number]]                               Final result               Extra Blood Bank Purple ...[[id number]]                      Final result               Extra Blood Bank Purple ...[[id number]]                      Final result                 Please view results for these tests on the individual orders.   Comprehensive metabolic panel     Status: Abnormal   Result Value Ref Range    Sodium 138 135 - 145 mmol/L    Potassium 4.7 3.4 - 5.3 mmol/L    Carbon Dioxide (CO2) 28 22 - 29 mmol/L    Anion Gap 8 7 - 15 mmol/L    Urea Nitrogen 32.8 (H) 6.0 - 20.0 mg/dL    Creatinine 1.03 0.67 - 1.17 mg/dL    GFR Estimate >90 >60 mL/min/1.73m2    Calcium 9.0 8.6 - 10.0 mg/dL    Chloride 102 98 - 107 mmol/L    Glucose 110 (H) 70 - 99 mg/dL    Alkaline Phosphatase 55 40 - 129 U/L    AST 16 0 - 45 U/L    ALT 21 0 - 70 U/L    Protein Total 5.9 (L) 6.4 - 8.3 g/dL    Albumin 4.2 3.5 - 5.2 g/dL    Bilirubin Total 0.7 <=1.2 mg/dL   Extra Blue Top Tube     Status: None   Result Value Ref Range    Hold Specimen JIC    Extra Red Top Tube     Status: None   Result Value Ref Range    Hold Specimen JIC    Extra Blood Bank Purple Top Tube     Status: None   Result Value Ref Range    Hold Specimen JIC    Extra Blood Bank Purple Top Tube     Status: None   Result Value Ref Range    Hold Specimen JIC    CBC with platelets and differential     Status: Abnormal   Result Value Ref Range    WBC Count 11.7 (H) 4.0 - 11.0 10e3/uL    RBC Count 4.79 4.40 - 5.90 10e6/uL    Hemoglobin 14.7 13.3 - 17.7 g/dL    Hematocrit 44.1 40.0  - 53.0 %    MCV 92 78 - 100 fL    MCH 30.7 26.5 - 33.0 pg    MCHC 33.3 31.5 - 36.5 g/dL    RDW 12.0 10.0 - 15.0 %    Platelet Count 179 150 - 450 10e3/uL    % Neutrophils 61 %    % Lymphocytes 26 %    % Monocytes 7 %    % Eosinophils 4 %    % Basophils 1 %    % Immature Granulocytes 1 %    NRBCs per 100 WBC 0 <1 /100    Absolute Neutrophils 7.4 1.6 - 8.3 10e3/uL    Absolute Lymphocytes 3.0 0.8 - 5.3 10e3/uL    Absolute Monocytes 0.8 0.0 - 1.3 10e3/uL    Absolute Eosinophils 0.4 0.0 - 0.7 10e3/uL    Absolute Basophils 0.1 0.0 - 0.2 10e3/uL    Absolute Immature Granulocytes 0.1 <=0.4 10e3/uL    Absolute NRBCs 0.0 10e3/uL   EKG 12-lead, tracing only     Status: None (Preliminary result)   Result Value Ref Range    Systolic Blood Pressure  mmHg    Diastolic Blood Pressure  mmHg    Ventricular Rate 103 BPM    Atrial Rate 103 BPM    TN Interval 140 ms    QRS Duration 76 ms     ms    QTc 424 ms    P Axis 42 degrees    R AXIS 46 degrees    T Axis 47 degrees    Interpretation ECG       Sinus tachycardia  Otherwise normal ECG  No previous ECGs available     Adult Type and Screen     Status: None   Result Value Ref Range    ABO/RH(D) A POS     Antibody Screen Negative Negative    SPECIMEN EXPIRATION DATE [id number]    CBC + differential     Status: Abnormal    Narrative    The following orders were created for panel order CBC + differential.  Procedure                               Abnormality         Status                     ---------                               -----------         ------                     CBC with platelets and d...[[id number]]  Abnormal            Final result                 Please view results for these tests on the individual orders.   ABO/Rh type and screen     Status: None    Narrative    The following orders were created for panel order ABO/Rh type and screen.  Procedure                               Abnormality         Status                     ---------                                -----------         ------                     Adult Type and Screen[[id number]]                            Final result                 Please view results for these tests on the individual orders.     Carla Vicente PA-C  [facility]  Securely message with the [employer] Web Console (learn more here)  Text page via [employer] Paging/Directory

## 2023-11-07 NOTE — CONSULTS
GASTROENTEROLOGY CONSULTATION      Vladislav Joseph  [address] [place] [address]  28 year old male     Admission Date/Time: 11/6/2023  Primary Care Provider: No Ref-Primary, Physician     We were asked to see the patient in consultation by LINDA Joiner for evaluation of hematemesis.    CC: hematemesis     HPI:  Vladislav Joseph is a 28 year old male without any significant past medical history, admitted 11/6 with hematemesis.    Patient reports a few days of nausea without vomiting.  Yesterday around 3 PM he had an episode of bright red bloody emesis (he showed me a picture) followed by 2 more bright red bloody emesis.  He had another episode in the emergency room yesterday evening but has not had any recurrence since that time.  He took dwayne seltzer at home without improvement. He denies any melena or hematochezia prior to presenting to the hospital but overnight he reports having a black stool that was unwitnessed by nursing staff. Denies any associated abdominal pain, hematochezia, dysphagia.      No prior history of GI bleeding in the past.  Denies any prior EGD. He denies any NSAID use.  He denies any alcohol use to me but reported to the admitting team occasional alcohol use with last drink including 4 beers on 11/3. He does not take any H2 blockers or PPIs.     Labs on presentation showed elevated white blood cell count of 11.7, normal hemoglobin 14.7, normal platelets 179, normal MCV at 92, elevated BUN at 32.8, otherwise normal CMP.  Hemoglobin has declined since presentation to 12.7 yesterday evening, 12.2 earlier this morning, most recent around 6 AM at 11.7.    No imaging done.    PAST MEDICAL HISTORY:  Patient Active Problem List    Diagnosis Date Noted    UGIB (upper gastrointestinal bleed) 11/06/2023     Priority: Medium    History of mononucleosis 08/03/2016     Priority: Medium    Major depressive disorder, single episode, moderate (H) 06/16/2016     Priority: Medium     "CARDIOVASCULAR SCREENING; LDL GOAL LESS THAN 160 06/06/2016     Priority: Medium          ROS: A comprehensive ten point review of systems was negative aside from those in mentioned in the HPI.       MEDICATIONS:   Prior to Admission medications    Not on File        ALLERGIES:   Allergies   Allergen Reactions    Penicillins Unknown        SOCIAL HISTORY:  Social History     Tobacco Use    Smoking status: Former   Substance Use Topics    Alcohol use: Yes     Comment: occ    Drug use: No        FAMILY HISTORY:  Family History   Problem Relation Age of Onset    Breast Cancer Paternal Grandmother     Other Cancer Paternal Grandmother     Diabetes No family hx of     Hypertension No family hx of         PHYSICAL EXAM:   /72   Pulse 103   Temp 97.8  F (36.6  C) (Temporal)   Resp 16   Ht 1.702 m (5' 7\")   Wt 90.1 kg (198 lb 10.2 oz)   SpO2 100%   BMI 31.11 kg/m       PHYSICAL EXAM:  General: alert, oriented, NAD  SKIN: no suspicious lesions, rashes, jaundice, or spider angiomas  HEAD: Normocephalic. No masses, lesions, tenderness or abnormalities  NECK: Neck supple. No adenopathy. Thyroid symmetric, normal size.  EYES: No scleral icterus  ENT: ENT exam normal, no neck nodes or sinus tenderness  RESPIRATORY: negative, Good diaphragmatic excursion. Lungs clear  CARDIOVASCULAR: negative, PMI normal. No lifts, heaves, or thrills. RRR. No murmurs, clicks gallops or rub  GASTROINTESTINAL: +BS, soft, NT, ND, no HSM, no masses/guarding/rebound  JOINT/EXTREMITIES: extremities normal- no gross deformities noted, gait normal and normal muscle tone  NEURO: Reflexes grossly normal and symmetric. Sensation grossly WNL.  PSYCH: no abnormal anxiety/depression  LYMPH: No anterior cervical, posterior cervical, or supraclavicular adenopathy     LABS:  I reviewed the patient's new clinical lab test results.   Recent Labs   Lab Test 11/07/23  0642 11/07/23  0052 11/06/23  1948 11/06/23  1653   WBC 9.1  --   --  11.7*   HGB 11.7* " 12.2* 12.7* 14.7   MCV 92  --   --  92     --   --  179     Recent Labs   Lab Test 11/07/23  0642 11/06/23  1653    138   POTASSIUM 3.9 4.7   CHLORIDE 105 102   CO2 26 28   BUN 26.9* 32.8*   ANIONGAP 10 8   MILAD 8.4* 9.0     Recent Labs   Lab Test 11/07/23  0642 11/06/23  1653   ALBUMIN 3.3* 4.2   BILITOTAL 0.4 0.7   ALT 15 21   AST 12 16   ALKPHOS 43 55        IMAGING  None     CONSULTATION ASSESSMENT AND PLAN:    28 year old male without any significant past medical history, admitted 11/6 with hematemesis.    1. Hematemesis. Differential includes peptic ulcer disease, AVMs, [place], less likely varices without history of chronic liver disease/occasional alcohol usage, less likely malignancy. Overt bleeding appears to have resolved, no further vomiting since yesterday evening. Had 1 reported episode of melena overnight that was unwitnessed. Hemodynamically stable. HGB has been declining since admission from 14.7 to 11.7.  --EGD.  --NPO.   --IV PPI BID.   --Monitor HGB and transfuse prn.     Discussed with Dr. Pierre    Total time spent:  40 minutes was spent providing patient care, including patient evaluation, reviewing documentation/test results, and . Thank you for asking us to participate in the care of this patient.      Michaelle Long, PAC  [facility] ([facility])  -----------------  I agree with the assessment and plan of Michaelle Long.  Patient admitted with hematemesis.  He denies any use of NSAIDs, supplements, herbs.  Reports occasional lower abdominal pain but nothing in the upper abdomen.  Had 1 black stool that he reports was either yesterday or today.  He drinks 3-4 beers about every 2 weeks.  On exam he is lying comfortable in bed.  No acute distress, abdomen is benign, heart regular rhythm and rate, nonlabored breathing.  Assessment and plan-  Hematemesis with anemia.  Proceed with EGD today, further recommendations to follow afterwards.    This was a shared  visit.  I spent about 20 minutes in the care of this patient prior to his upper endoscopy.    Nikita Pierre MD

## 2023-11-07 NOTE — ED NOTES
[facility]  ED Nurse Handoff Report    ED Chief complaint: Hematemesis  . ED Diagnosis:   Final diagnoses:   Upper GI bleed       Allergies:   Allergies   Allergen Reactions    Penicillins Unknown       Code Status: Full Code    Activity level - Baseline/Home:  independent.  Activity Level - Current:   assist of 1.   Lift room needed: No.   Bariatric: No   Needed: No   Isolation: No.   Infection: Not Applicable.     Respiratory status: Room air    Vital Signs (within 30 minutes):   Vitals:    11/06/23 1727 11/06/23 1742 11/06/23 1757 11/06/23 1812   BP: 114/65 116/65 106/74    Pulse: 106 113 105 110   Resp:       Temp:       TempSrc:       SpO2: 95% 99% 99% 100%   Weight:           Cardiac Rhythm:  ,      Pain level:    Patient confused: No.   Patient Falls Risk: bed/chair alarm on, nonskid shoes/slippers when out of bed, arm band in place, and patient and family education.   Elimination Status: Has voided     Patient Report - Initial Complaint: Hematemesis, syncopal episode after getting IV placed.   Focused Assessment: 28 year old male with a history of acid reflux who presents with hematemesis. The patient states that today he was feeling nauseated all day then around 1530 he had 3 episodes of vomiting, all containing bright red blood. The patient showed me a picture of one episode which looked like maroon colored vomit with some clots.  He states that his acid reflux has gotten so bad in the past that vomiting has made him feel better, last time vomiting before today was about a month ago. He mentions that he became very light-headed while in triage and thinks he might have had a syncopal episode after getting an IV set up. He states he does not regularly drink alcohol, no recent aspirin use, no suspicious foods today. He denies any blood in stool.      Abnormal Results:   Labs Ordered and Resulted from Time of ED Arrival to Time of ED Departure   COMPREHENSIVE METABOLIC PANEL -  Abnormal       Result Value    Sodium 138      Potassium 4.7      Carbon Dioxide (CO2) 28      Anion Gap 8      Urea Nitrogen 32.8 (*)     Creatinine 1.03      GFR Estimate >90      Calcium 9.0      Chloride 102      Glucose 110 (*)     Alkaline Phosphatase 55      AST 16      ALT 21      Protein Total 5.9 (*)     Albumin 4.2      Bilirubin Total 0.7     CBC WITH PLATELETS AND DIFFERENTIAL - Abnormal    WBC Count 11.7 (*)     RBC Count 4.79      Hemoglobin 14.7      Hematocrit 44.1      MCV 92      MCH 30.7      MCHC 33.3      RDW 12.0      Platelet Count 179      % Neutrophils 61      % Lymphocytes 26      % Monocytes 7      % Eosinophils 4      % Basophils 1      % Immature Granulocytes 1      NRBCs per 100 WBC 0      Absolute Neutrophils 7.4      Absolute Lymphocytes 3.0      Absolute Monocytes 0.8      Absolute Eosinophils 0.4      Absolute Basophils 0.1      Absolute Immature Granulocytes 0.1      Absolute NRBCs 0.0     TYPE AND SCREEN, ADULT    ABO/RH(D) A POS      Antibody Screen Negative      SPECIMEN EXPIRATION DATE [id number]     ABO/RH TYPE AND SCREEN        No orders to display       Treatments provided: Labs, IV fluids, IV zofran, IV protonix  Family Comments: Mother aware  OBS brochure/video discussed/provided to patient:  Yes  ED Medications:   Medications   lactated ringers BOLUS 1,000 mL (1,000 mLs Intravenous Not Given 11/6/23 1842)   ondansetron (ZOFRAN) injection 4 mg (4 mg Intravenous $Given 11/6/23 1722)   sodium chloride 0.9% BOLUS 1,000 mL (1,000 mLs Intravenous $New Bag 11/6/23 1722)   pantoprazole (PROTONIX) IV push injection 80 mg (80 mg Intravenous $Given 11/6/23 1802)       Drips infusing:  No  For the majority of the shift this patient was Green.   Interventions performed were N/A.    Sepsis treatment initiated: No    Cares/treatment/interventions/medications to be completed following ED care: Evaluation for possible endoscopy due to hematemesis, IV fluids    ED Nurse Name: Yocasta BECK  O'Jack, RN  6:43 PM    RECEIVING UNIT ED HANDOFF REVIEW    Above ED Nurse Handoff Report was reviewed: Yes  Reviewed by: Jovana Marquez RN on November 6, 2023 at 7:44 PM

## 2023-11-07 NOTE — ANESTHESIA POSTPROCEDURE EVALUATION
Patient: Vladislav Joseph    Procedure: Procedure(s):  ESOPHAGOGASTRODUODENOSCOPY       Anesthesia Type:  MAC    Note:  Disposition: Outpatient   Postop Pain Control: Uneventful            Sign Out: Well controlled pain   PONV: No   Neuro/Psych: Uneventful            Sign Out: Acceptable/Baseline neuro status   Airway/Respiratory: Uneventful            Sign Out: Acceptable/Baseline resp. status   CV/Hemodynamics: Uneventful            Sign Out: Acceptable CV status; No obvious hypovolemia; No obvious fluid overload   Other NRE: NONE   DID A NON-ROUTINE EVENT OCCUR? No           Last vitals:  Vitals Value Taken Time   BP 97/58 11/07/23 1510   Temp 97.6  F (36.4  C) 11/07/23 1500   Pulse 81 11/07/23 1510   Resp 16 11/07/23 1430   SpO2 100 % 11/07/23 1513   Vitals shown include unfiled device data.    Electronically Signed By: Gianni Dejesus MD  November 7, 2023  3:50 PM

## 2023-11-07 NOTE — ANESTHESIA PREPROCEDURE EVALUATION
Anesthesia Pre-Procedure Evaluation    Patient: Vladislav Joseph   MRN: [id number] : 1995        Procedure : Procedure(s):  ESOPHAGOGASTRODUODENOSCOPY          History reviewed. No pertinent past medical history.   Past Surgical History:   Procedure Laterality Date    NO HISTORY OF SURGERY        Allergies   Allergen Reactions    Penicillins Unknown      Social History     Tobacco Use    Smoking status: Former    Smokeless tobacco: Not on file   Substance Use Topics    Alcohol use: Yes     Comment: occ      Wt Readings from Last 1 Encounters:   23 90.1 kg (198 lb 10.2 oz)        Anesthesia Evaluation            ROS/MED HX  ENT/Pulmonary:  - neg pulmonary ROS     Neurologic:  - neg neurologic ROS     Cardiovascular:  - neg cardiovascular ROS     METS/Exercise Tolerance: >4 METS    Hematologic: Comments: Lab Test        23                       1153          0642          0052          1948          1653          WBC           --          9.1           --           --          11.7*         HGB          11.1*        11.7*        12.2*          < >        14.7          MCV           --          92            --           --          92            PLT           --          152           --           --          179            < > = values in this interval not displayed.                  Lab Test        23                       0642          1653          NA           141          138           POTASSIUM    3.9          4.7           CHLORIDE     105          102           CO2          26           28            BUN          26.9*        32.8*         CR           1.05         1.03          ANIONGAP     10           8             MILAD          8.4*         9.0           GLC          98           110*              (+)      anemia,          Musculoskeletal:  - neg musculoskeletal ROS     GI/Hepatic: Comment: hematemesis     "  Renal/Genitourinary:  - neg Renal ROS     Endo:  - neg endo ROS     Psychiatric/Substance Use:  - neg psychiatric ROS     Infectious Disease:  - neg infectious disease ROS     Malignancy:  - neg malignancy ROS     Other:  - neg other ROS          Physical Exam    Airway        Mallampati: II   TM distance: > 3 FB   Neck ROM: full   Mouth opening: > 3 cm    Respiratory Devices and Support         Dental       (+) Minor Abnormalities - some fillings, tiny chips      Cardiovascular   cardiovascular exam normal          Pulmonary   pulmonary exam normal                OUTSIDE LABS:  CBC:   Lab Results   Component Value Date    WBC 9.1 11/07/2023    WBC 11.7 (H) 11/06/2023    HGB 11.1 (L) 11/07/2023    HGB 11.7 (L) 11/07/2023    HCT 34.8 (L) 11/07/2023    HCT 44.1 11/06/2023     11/07/2023     11/06/2023     BMP:   Lab Results   Component Value Date     11/07/2023     11/06/2023    POTASSIUM 3.9 11/07/2023    POTASSIUM 4.7 11/06/2023    CHLORIDE 105 11/07/2023    CHLORIDE 102 11/06/2023    CO2 26 11/07/2023    CO2 28 11/06/2023    BUN 26.9 (H) 11/07/2023    BUN 32.8 (H) 11/06/2023    CR 1.05 11/07/2023    CR 1.03 11/06/2023    GLC 98 11/07/2023     (H) 11/06/2023     COAGS: No results found for: \"PTT\", \"INR\", \"FIBR\"  POC: No results found for: \"BGM\", \"HCG\", \"HCGS\"  HEPATIC:   Lab Results   Component Value Date    ALBUMIN 3.3 (L) 11/07/2023    PROTTOTAL 5.1 (L) 11/07/2023    ALT 15 11/07/2023    AST 12 11/07/2023    ALKPHOS 43 11/07/2023    BILITOTAL 0.4 11/07/2023     OTHER:   Lab Results   Component Value Date    MILAD 8.4 (L) 11/07/2023    TSH 0.22 (L) 06/06/2016    T4 0.99 06/06/2016       Anesthesia Plan    ASA Status:  2       Anesthesia Type: MAC.     - Reason for MAC: immobility needed   Induction: Propofol.   Maintenance: TIVA.        Consents    Anesthesia Plan(s) and associated risks, benefits, and realistic alternatives discussed. Questions answered and " patient/representative(s) expressed understanding.     - Discussed:     - Discussed with:  Patient      - Extended Intubation/Ventilatory Support Discussed: No.      - Patient is DNR/DNI Status: No     Use of blood products discussed: No .     Postoperative Care    Pain management: IV analgesics.   PONV prophylaxis: Dexamethasone or Solumedrol, Ondansetron (or other 5HT-3)     Comments:                Gianni Dejesus MD

## 2023-11-07 NOTE — PHARMACY-ADMISSION MEDICATION HISTORY
Pharmacist Admission Medication History    Admission medication history is complete. The information provided in this note is only as accurate as the sources available at the time of the update.    Information Source(s): Patient via in-person      Changes made to PTA medication list:  Added: None  Deleted: old medications(celexa, clotrimazole)  Changed: None    Medication Affordability:       Allergies reviewed with patient and updates made in EHR: yes    Medication History Completed By: César Scott RPH 11/7/2023 10:21 AM    No outpatient medications have been marked as taking for the 11/6/23 encounter (Hospital Encounter).

## 2023-11-07 NOTE — PLAN OF CARE
Started care for pt at 0700 and pt went down for EGD at 1030. Pt a/o x4. Denied pain. Denied nausea. SBA in room, voiding. No emesis or BM this morning. LR running. Dizzy at times with movement. Plan for pt to discharge from PACU.

## 2023-11-07 NOTE — DISCHARGE SUMMARY
Physician Discharge Summary           [facility]  Hospitalist Discharge Summary-Counts include 234 beds at the [facility]    Name: Vladislav Joseph    MRN: [id number]     YOB: 1995    Age: 28 year old                                                     Primary care provider: No Ref-Primary, Physician    Admit date:  11/6/2023    Discharge date and time: 11/7/2023    Discharge Physician: Samuel Maher M.D., M.B.A.       Primary Discharge Diagnosis      LA Grade D esophagitis with contact mild bleeding.   Medium-sized hiatal hernia.   GI bleed   Acute blood loss anemia due to GI bleed       Secondary Diagnosis /chronic medical conditions     GERD       Brief Summary of Hospital stay :       Please refer to  Admission H&P note  and subsequent progress notes in EMR for full details of patient care.    Reason for Hospitalization(C/C,HPI and brief patient summary):hematemesis       Significant findings(Primary diagnosis )Procedures and treatments provided(Hospital course ,consults, procedures):Please see below for details    Vladislav Joseph is a 28 year old male with PMH significant for GERD not on medical management who presents to the ED on 11/6/2023 for evaluation of hematemesis.    ED work-up reveals: tachycardia otherwise hemodynamically stable, mild leukocytosis of 11.7, Hgb of 14.7, BUN of 32.8, protein of 5.9, glucose of 110, type and screen performed, and EKG shows rate of 103 bpm in sinus tachycardia.     Problem list (medical problems addressed during hospital stay):    LA Grade D esophagitis with contact mild bleeding.   --nauseated all day on 11/6 with onset around 15:00 of hematemesis x3 that appeared more maroon than bright red in color. Episode of diarrhea (uncertain if melena or hematochezia since patient did not look) followed by hematemesis in the ED. Initial Hgb stable at 14.7. Tachycardic but BP stable currently. Denies regular NSAID use and reports to only be an occasional alcohol user  with last drink the evening of 11/3 where the patient consumed 4 beers.   --Was briefly admitted and treated with PPI IV BID and GI consulted   -- EGD done on Nov 7,2023 was reported as below:  [facility]   _______________________________________________________________________________   Patient Name: Vladislav Joseph   Procedure Date: 11/7/2023 1:40 PM   MRN: [id number]                       Account Number: [id number]   YOB: 1995               Admit Type: Outpatient   Age: 28                               Gender: Male   Attending MD: MEGAN HAMPTON MD,  Total Sedation Time: MAC sedation   Instrument Name: 206 - Gastroscope      _______________________________________________________________________________      Procedure:               Upper GI endoscopy   Indications:              Hematemesis   Providers:                MEGAN HAMPTON MD (Doctor)   Referring MD:               Medicines:                Monitored Anesthesia Care   Complications:            No immediate complications.   _______________________________________________________________________________   Procedure:      EGD                                                                                    Findings:       LA Grade D (one or more mucosal breaks involving at least 75% of        esophageal circumference) esophagitis with mild bleeding on contact was        found.        A medium-sized hiatal hernia was present (z-line and gastric folds at 36        cm, hiatus at 40 cm)        The examined duodenum was normal.                                                                                    Impression:               - LA Grade D esophagitis with contact mild bleeding.                             - Medium-sized hiatal hernia.                             - Normal examined duodenum.   Recommendation:           - Omeprazole 20 mg po BID x 3 months.                             - In 8 weeks  "repeat EGD to evaluate for healing and                             check for Laureano's esophagus.                                                                           Medium-sized hiatal hernia.   GI bleed   - No recurrence and advised to follow with PCP and GI     Acute blood loss anemia due to GI bleed   Hemoglobin   Date Value Ref Range Status   11/07/2023 11.1 (L) 13.3 - 17.7 g/dL Final   ]   -- No indication of blood transfusion         Consultations during hospital stay:       GASTROENTEROLOGY IP CONSULT      Patient discharge Condition:     stable    BP 98/62   Pulse 79   Temp 97.6  F (36.4  C) (Temporal)   Resp 16   Ht 1.702 m (5' 7\")   Wt 90.1 kg (198 lb 10.2 oz)   SpO2 100%   BMI 31.11 kg/m         Discharge Instructions:       Patient/family instructions: Written discharge instruction given to patient/family    Discharge Medications:       Review of your medicines        START taking        Dose / Directions   omeprazole 20 MG DR capsule  Commonly known as: PriLOSEC  Used for: Upper GI bleed      Dose: 20 mg  Take 1 capsule (20 mg) by mouth 2 times daily for 90 days  Quantity: 180 capsule  Refills: 0               Where to get your medicines        These medications were sent to [facility] [place] [id number] [address]      Phone: [phone]   omeprazole 20 MG DR capsule          Discharge diet:Orders Placed This Encounter      Regular Diet Adult      Diet        Discharge activity:Activity as tolerated      Discharge follow-up:    Follow up with primary care provider in 7-14  days or earlier if symptoms return or gets worse.    Follow up with consultant as instructed  with GI       Other instructions:    We discussed with patient/family about detail discharge instructions as well as discharge medications above including potential risks,side effects and benefits.Patient/family understood benefits and potential serious " "side effects of taking these medications and need to follow up with PCP if the patient develops complications.  Patient is also advised to see a doctor immediately for severe symptoms.        Major procedure performed/  Significant Diagnostic Studies:       Recent Labs   Lab 11/07/23  1153 11/07/23  0642 11/07/23  0052 11/06/23  1948 11/06/23  1653   WBC  --  9.1  --   --  11.7*   HGB 11.1* 11.7* 12.2*   < > 14.7   HCT  --  34.8*  --   --  44.1   MCV  --  92  --   --  92   PLT  --  152  --   --  179    < > = values in this interval not displayed.     No results for input(s): \"CULT\" in the last 168 hours.  Recent Labs   Lab 11/07/23  0642 11/06/23  1653    138   POTASSIUM 3.9 4.7   CHLORIDE 105 102   CO2 26 28   ANIONGAP 10 8   GLC 98 110*   BUN 26.9* 32.8*   CR 1.05 1.03   GFRESTIMATED >90 >90   MILAD 8.4* 9.0   PROTTOTAL 5.1* 5.9*   ALBUMIN 3.3* 4.2   BILITOTAL 0.4 0.7   ALKPHOS 43 55   AST 12 16   ALT 15 21       Recent Labs   Lab 11/07/23  0642 11/06/23  1653   GLC 98 110*       Pending Results:       Unresulted Labs Ordered in the Past 30 Days of this Admission       No orders found for last 31 day(s).               Patient Allergies:       Allergies   Allergen Reactions    Penicillins Unknown         Disposition:     Disposition: home    I saw and evaluated the patient on day of discharge and  discharge instructions reviewed  and  all the patient's questions and concerns addressed. Over 30 minutes spent on discharge and coordination of discharge process for this patient.      Disclaimer: This note consists of symbols derived from keyboarding, dictation and/or voice recognition software. As a result, there may be errors in the script that have gone undetected. Please consider this when interpreting information found in this chart      "

## 2023-11-07 NOTE — DISCHARGE INSTRUCTIONS
ESOPHAGOGASTRODUODENOSCOPY DISCHARGE INSTRUCTIONS    You may not drive, use heavy equipment or consume alcohol for 24 hours because the drugs you were given may cause dizziness, drowsiness, forgetfulness and slower reaction time.    Small pieces or tissue (biopsies) or polyps may have been removed.    You may resume your regular diet and medications. Exception: If you had a biopsy or polypectomy, do not take aspirin, aleve (naproxen) or ibuprofen for the next 10 days.  Tylenol (acetaminophen) is safe to take.    Additional instructions:  If you had a biopsy or polypectomy, the pathology report will be sent to your doctor.  If you have not received the results within 10 days, call your doctor's office.    What to watch for:  Problems rarely occur after the procedure.  It is important for you to be aware of the early signs of a possible complication.  Call immediately if you notice any of the followin.  Unusual pain or difficulty swallowing.    2.  Unusual abdominal or chest pain.    3.  Vomiting of blood.    4.  Black or bloody stools.    5.  Temperature above 100.6 degrees F        DR. MEGAN HAMPTON M.D.      CLINIC PHONE NUMBER:  [phone]  [facility]   GENERAL ANESTHESIA OR SEDATION ADULT DISCHARGE INSTRUCTIONS   SPECIAL PRECAUTIONS FOR 24 HOURS AFTER SURGERY    IT IS NOT UNUSUAL TO FEEL LIGHT-HEADED OR FAINT, UP TO 24 HOURS AFTER SURGERY OR WHILE TAKING PAIN MEDICATION.  IF YOU HAVE THESE SYMPTOMS; SIT FOR A FEW MINUTES BEFORE STANDING AND HAVE SOMEONE ASSIST YOU WHEN YOU GET UP TO WALK OR USE THE BATHROOM.    YOU SHOULD REST AND RELAX FOR THE NEXT 24 HOURS AND YOU MUST MAKE ARRANGEMENTS TO HAVE SOMEONE STAY WITH YOU FOR AT LEAST 24 HOURS AFTER YOUR DISCHARGE.  AVOID HAZARDOUS AND STRENUOUS ACTIVITIES.  DO NOT MAKE IMPORTANT DECISIONS FOR 24 HOURS.    DO NOT DRIVE ANY VEHICLE OR OPERATE MECHANICAL EQUIPMENT FOR 24 HOURS FOLLOWING THE END OF YOUR SURGERY.  EVEN THOUGH YOU MAY FEEL NORMAL,  YOUR REACTIONS MAY BE AFFECTED BY THE MEDICATION YOU HAVE RECEIVED.    DO NOT DRINK ALCOHOLIC BEVERAGES FOR 24 HOURS FOLLOWING YOUR SURGERY.    DRINK CLEAR LIQUIDS (APPLE JUICE, GINGER ALE, 7-UP, BROTH, ETC.).  PROGRESS TO YOUR REGULAR DIET AS YOU FEEL ABLE.    YOU MAY HAVE A DRY MOUTH, A SORE THROAT, MUSCLES ACHES OR TROUBLE SLEEPING.  THESE SHOULD GO AWAY AFTER 24 HOURS.    CALL YOUR DOCTOR FOR ANY OF THE FOLLOWING:  SIGNS OF INFECTION (FEVER, GROWING TENDERNESS AT THE SURGERY SITE, A LARGE AMOUNT OF DRAINAGE OR BLEEDING, SEVERE PAIN, FOUL-SMELLING DRAINAGE, REDNESS OR SWELLING.    IT HAS BEEN OVER 8 TO 10 HOURS SINCE SURGERY AND YOU ARE STILL NOT ABLE TO URINATE (PASS WATER).

## 2023-11-07 NOTE — ANESTHESIA CARE TRANSFER NOTE
Patient: Vladislav Joseph    Procedure: Procedure(s):  ESOPHAGOGASTRODUODENOSCOPY       Diagnosis: Upper GI bleed [K92.2]  Diagnosis Additional Information: No value filed.    Anesthesia Type:   MAC     Note:    Oropharynx: oropharynx clear of all foreign objects and spontaneously breathing  Level of Consciousness: drowsy  Oxygen Supplementation: nasal cannula  Level of Supplemental Oxygen (L/min / FiO2): 2  Independent Airway: airway patency satisfactory and stable  Dentition: dentition unchanged  Vital Signs Stable: post-procedure vital signs reviewed and stable  Report to RN Given: handoff report given  Patient transferred to: PACU  Comments: Resting and stable   Handoff Report: Identifed the Patient, Identified the Reponsible Provider, Reviewed the pertinent medical history, Discussed the surgical course, Reviewed Intra-OP anesthesia mangement and issues during anesthesia and Allowed opportunity for questions and acknowledgement of understanding      Vitals:  Vitals Value Taken Time   BP     Temp     Pulse     Resp     SpO2 100 % 11/07/23 1408   Vitals shown include unfiled device data.    Electronically Signed By: CHIARA Oneill CRNA  November 7, 2023  2:09 PM

## 2023-11-07 NOTE — PLAN OF CARE
[facility]    ED Boarding Nurse Handoff Addendum Report:    Date/time: 11/6/2023, 7:30 PM    Activity Level: standby GB    Fall Risk: Yes:  patient and family education, assistive device/personal items within reach, and activity supervised    Active Infusions: NA    Current Meds Due: 1,000 mL LR bolus needed.     Current care needs: anti nausea meds.    Oxygen requirements (liters/min and/or FiO2): NA    Respiratory status: Room air    Vital signs (within last 30 minutes):    Vitals:    11/06/23 1757 11/06/23 1812 11/06/23 1830 11/06/23 1924   BP: 106/74  90/59 95/73   BP Location:    Left arm   Pulse: 105 110 103 106   Resp:       Temp:       TempSrc:       SpO2: 99% 100% 100% 100%   Weight:           Focused assessment within last 30 minutes:      Patient is Alert and Oriented x4. They are SBA with Gait Belt. Pt is a NPO diet.  They are denying pain.  Patient has Lactated Ringer bolus of 1000 mL needing to be administered. Pt experiencing nausea transported to the floor. Reassess on arrival. Hgb recheck done, results pending.     ED Boarding Nurse name: Magi Lind RN

## 2023-11-07 NOTE — PROCEDURES
PRE-PROCEDURE H&P    CHIEF COMPLAINT / REASON FOR PROCEDURE:  hematemesis    PERTINENT HISTORY :    History reviewed. No pertinent past medical history.   Past Surgical History:   Procedure Laterality Date    NO HISTORY OF SURGERY           Bleeding tendencies:  No    Relevant Family History:  NONE     Relevant Social History:  NONE      A relevant review of systems was performed and was negative      ALLERGIES/SENSITIVITIES:   Allergies   Allergen Reactions    Penicillins Unknown       CURRENT MEDICATIONS:   No current outpatient medications on file.        PRE-SEDATION ASSESSMENT:    Lung Exam:  normal  Heart Exam:  normal  Airway Exam: normal  Previous reaction to anesthesia/sedation:   No  Sedation plan based on assessment: mac  ASA Classification:  2 - Mild systemic disease        IMPRESSION:  hematemesis    PLAN:  egd     Karoline Pierre MD  [facility]  Office: [phone]

## 2025-05-17 ENCOUNTER — HEALTH MAINTENANCE LETTER (OUTPATIENT)
Age: 30
End: 2025-05-17

## (undated) DEVICE — SOL WATER IRRIG 1000ML BOTTLE 2F7114

## (undated) DEVICE — TUBING SUCTION 6"X3/16" N56A

## (undated) DEVICE — LINEN FULL SHEET 5511

## (undated) DEVICE — LINEN HALF SHEET 5512

## (undated) DEVICE — SUCTION MANIFOLD NEPTUNE 2 SYS 4 PORT 0702-020-000

## (undated) DEVICE — KIT PROCEDURE W/CLEAN-A-SCOPE LINERS V2 200800

## (undated) DEVICE — GOWN XLG DISP 9545

## (undated) DEVICE — PAD CHUX UNDERPAD 30X36" P3036C

## (undated) DEVICE — MANIFOLD NEPTUNE 4 PORT 700-20

## (undated) RX ORDER — PROPOFOL 10 MG/ML
INJECTION, EMULSION INTRAVENOUS
Status: DISPENSED
Start: 2023-11-07

## (undated) RX ORDER — LIDOCAINE HYDROCHLORIDE 10 MG/ML
INJECTION, SOLUTION EPIDURAL; INFILTRATION; INTRACAUDAL; PERINEURAL
Status: DISPENSED
Start: 2023-11-07

## (undated) RX ORDER — ONDANSETRON 2 MG/ML
INJECTION INTRAMUSCULAR; INTRAVENOUS
Status: DISPENSED
Start: 2023-11-07

## (undated) RX ORDER — GLYCOPYRROLATE 0.2 MG/ML
INJECTION INTRAMUSCULAR; INTRAVENOUS
Status: DISPENSED
Start: 2023-11-07